# Patient Record
(demographics unavailable — no encounter records)

---

## 2024-11-08 NOTE — HEALTH RISK ASSESSMENT
[Excellent] : ~his/her~  mood as  excellent [Yes] : Yes [Monthly or less (1 pt)] : Monthly or less (1 point) [1 or 2 (0 pts)] : 1 or 2 (0 points) [Never (0 pts)] : Never (0 points) [No] : In the past 12 months have you used drugs other than those required for medical reasons? No [No falls in past year] : Patient reported no falls in the past year [0] : 2) Feeling down, depressed, or hopeless: Not at all (0) [PHQ-2 Negative - No further assessment needed] : PHQ-2 Negative - No further assessment needed [Never] : Never [None] : None [With Family] : lives with family [Feels Safe at Home] : Feels safe at home [Fully functional (bathing, dressing, toileting, transferring, walking, feeding)] : Fully functional (bathing, dressing, toileting, transferring, walking, feeding) [Fully functional (using the telephone, shopping, preparing meals, housekeeping, doing laundry, using] : Fully functional and needs no help or supervision to perform IADLs (using the telephone, shopping, preparing meals, housekeeping, doing laundry, using transportation, managing medications and managing finances) [Reports normal functional visual acuity (ie: able to read med bottle)] : Reports normal functional visual acuity [Smoke Detector] : smoke detector [Seat Belt] :  uses seat belt [Audit-CScore] : 1 [[id number]] : 0 [Change in mental status noted] : No change in mental status noted [Language] : denies difficulty with language [Behavior] : denies difficulty with behavior [Handling Complex Tasks] : denies difficulty handling complex tasks [Reasoning] : denies difficulty with reasoning [Spatial Ability and Orientation] : denies difficulty with spatial ability and orientation [Reports changes in hearing] : Reports no changes in hearing [Reports changes in vision] : Reports no changes in vision [Reports changes in dental health] : Reports no changes in dental health

## 2024-11-08 NOTE — HISTORY OF PRESENT ILLNESS
[FreeTextEntry1] : Ms. SPENCER is here for an annual physical examination and assessment. [de-identified] : She generally feels well with no specific complaints. Her recent health has been good.  Denies headache, dizziness. Denies rash, fatigue, fever, weight loss, anorexia. Denies cough, wheezing. Denies CP, SOB, MENENDEZ, edema, palpitations. Denies abdominal pain, N/V/D/C. Denies BRBPR, melena, dysphagia. Denies dysuria, frequency, hematuria, hesitancy. Denies weakness, numbness, gait instability.

## 2024-11-08 NOTE — HISTORY OF PRESENT ILLNESS
[FreeTextEntry1] : Ms. SPENCER is here for an annual physical examination and assessment. [de-identified] : She generally feels well with no specific complaints. Her recent health has been good.  Denies headache, dizziness. Denies rash, fatigue, fever, weight loss, anorexia. Denies cough, wheezing. Denies CP, SOB, MENENDEZ, edema, palpitations. Denies abdominal pain, N/V/D/C. Denies BRBPR, melena, dysphagia. Denies dysuria, frequency, hematuria, hesitancy. Denies weakness, numbness, gait instability.

## 2024-11-08 NOTE — HEALTH RISK ASSESSMENT
[Excellent] : ~his/her~  mood as  excellent [Yes] : Yes [Monthly or less (1 pt)] : Monthly or less (1 point) [1 or 2 (0 pts)] : 1 or 2 (0 points) [Never (0 pts)] : Never (0 points) [No] : In the past 12 months have you used drugs other than those required for medical reasons? No [No falls in past year] : Patient reported no falls in the past year [0] : 2) Feeling down, depressed, or hopeless: Not at all (0) [PHQ-2 Negative - No further assessment needed] : PHQ-2 Negative - No further assessment needed [Never] : Never [None] : None [With Family] : lives with family [Feels Safe at Home] : Feels safe at home [Fully functional (bathing, dressing, toileting, transferring, walking, feeding)] : Fully functional (bathing, dressing, toileting, transferring, walking, feeding) [Fully functional (using the telephone, shopping, preparing meals, housekeeping, doing laundry, using] : Fully functional and needs no help or supervision to perform IADLs (using the telephone, shopping, preparing meals, housekeeping, doing laundry, using transportation, managing medications and managing finances) [Reports normal functional visual acuity (ie: able to read med bottle)] : Reports normal functional visual acuity [Smoke Detector] : smoke detector [Seat Belt] :  uses seat belt [Audit-CScore] : 1 [[id number]] : 0 [Change in mental status noted] : No change in mental status noted [Language] : denies difficulty with language [Behavior] : denies difficulty with behavior [Handling Complex Tasks] : denies difficulty handling complex tasks [Reasoning] : denies difficulty with reasoning [Spatial Ability and Orientation] : denies difficulty with spatial ability and orientation [Reports changes in hearing] : Reports no changes in hearing [Reports changes in vision] : Reports no changes in vision [Reports changes in dental health] : Reports no changes in dental health

## 2024-12-17 NOTE — HISTORY OF PRESENT ILLNESS
[FreeTextEntry1] : KOFFI [Mammogramdate] : emr [BreastSonogramDate] : emr [PapSmeardate] : emr [BoneDensityDate] : emr [ColonoscopyDate] : emr

## 2024-12-17 NOTE — PHYSICAL EXAM
[Chaperone Present] : A chaperone was present in the examining room during all aspects of the physical examination [Appropriately responsive] : appropriately responsive [Alert] : alert [No Acute Distress] : no acute distress [No Lymphadenopathy] : no lymphadenopathy [Soft] : soft [Non-tender] : non-tender [Non-distended] : non-distended [No HSM] : No HSM [No Lesions] : no lesions [No Mass] : no mass [Oriented x3] : oriented x3 [FreeTextEntry2] : VS see emr [Examination Of The Breasts] : a normal appearance [No Masses] : no breast masses were palpable [Labia Majora] : normal [Labia Minora] : normal [Atrophy] : atrophy [Normal] : normal [Uterine Adnexae] : normal

## 2025-01-17 NOTE — CONSULT LETTER
[Dear  ___] : Dear  [unfilled], [Please see my note below.] : Please see my note below. [Sincerely,] : Sincerely, [FreeTextEntry3] : Najma Dean MD , [facility] at [facility] Division of Rheumatology

## 2025-01-17 NOTE — PHYSICAL EXAM
[General Appearance - In No Acute Distress] : in no acute distress [General Appearance - Alert] : alert [Respiration, Rhythm And Depth] : normal respiratory rhythm and effort [Impaired Insight] : insight and judgment were intact [FreeTextEntry1] : faint telangiectasias face and hands

## 2025-01-17 NOTE — ASSESSMENT
[FreeTextEntry1] : # Raynaud's -longstanding -triggered by cold exposure, resolves with rewarming -no pitting or distal lesions on exam -faint telangiectasias face -JACKSON negative, no other stigmata by history or exam   # Hand, foot pain -appear non inflammatory  -OA changes on exam  # Rash fingers over the summer from pictures appear contact dermatitis  # Osteoporosis receives Prolia with endo   [] obtain SSA/SSB, Scleroderma labs, RF/CCP  [] discussed Raynaud's and treatment at length [] obtain X-rays of hands and feet [] planning to see dermatology per PMD's recommendations  Will call with results

## 2025-01-17 NOTE — HISTORY OF PRESENT ILLNESS
[FreeTextEntry1] :  -1 year and a half ago, slipped and fell on her left hand, outstretched, no fracture but persistent pain has received steroid injection at the wrist -July started noticing a rash over her fingers, swelling of the fingers -history of Raynaud's for many years described as coldness of fingers turn white, resolves after rewarming  -joint reports hand stiffness  no morning stiffness no overt joint swelling    Rheumatologic ROS: - No alopecia - No dry eyes - No dry mouth - No oral ulcers - No photosensitivity - No miscarriages or pregnancy complications - No history of blood clots - No family history of auto-immune disease

## 2025-03-20 NOTE — PHYSICAL EXAM
[Alert] : alert [Well Nourished] : well nourished [No Acute Distress] : no acute distress [Well Developed] : well developed [Normal Sclera/Conjunctiva] : normal sclera/conjunctiva [No Proptosis] : no proptosis [Normal Oropharynx] : the oropharynx was normal [Thyroid Not Enlarged] : the thyroid was not enlarged [No Thyroid Nodules] : no palpable thyroid nodules [Clear to Auscultation] : lungs were clear to auscultation bilaterally [Normal S1, S2] : normal S1 and S2 [Normal Rate] : heart rate was normal [Regular Rhythm] : with a regular rhythm [No Edema] : no peripheral edema [Normal Bowel Sounds] : normal bowel sounds [Not Tender] : non-tender [Not Distended] : not distended [Soft] : abdomen soft [Normal Anterior Cervical Nodes] : no anterior cervical lymphadenopathy [No Spinal Tenderness] : no spinal tenderness [Scoliosis] : scoliosis present [No Stigmata of Cushings Syndrome] : no stigmata of Cushings Syndrome [Normal Gait] : normal gait [Normal Strength/Tone] : muscle strength and tone were normal [No Rash] : no rash [Normal Reflexes] : deep tendon reflexes were 2+ and symmetric [No Tremors] : no tremors [Oriented x3] : oriented to person, place, and time [Normal Rate and Effort] : normal respiratory rate and effort [No Murmurs] : no murmurs [Acanthosis Nigricans] : no acanthosis nigricans [de-identified] : mild scoliosis

## 2025-03-20 NOTE — ASSESSMENT
[Denosumab Therapy] : Risks  and benefits of denosumab therapy were discussed with the patient including eczema, cellulitis, osteonecrosis of the jaw and atypical femur fractures [FreeTextEntry1] : 61 y/o female returns for a follow-up visit for osteoporosis.  Pt states she was told of osteoporosis after outside BMD 06/2021. She has not started any osteoporosis rx. Fh/o in mother and grandmother, no hip fx. No h/o fragility fx.  No unusual risks for osteoporosis. Outside BMD 06/2021 indicates low osteoporosis in spine and hip.  Pt started Fosamax 07/2021. Took correctly, but d/c 12/2021 due to non-specific symptoms.  Pt transitioned to Prolia 01/2022, tolerating well. No thigh pain, no interval fx. Normal Ca. No ONJ.   BMD 03/2025 indicates stable osteoporosis in the spine, improved vs baseline, stable osteoporosis in hips, stable osteopenia in prox. radius. BMD results reviewed w/pt.  Continue Prolia, [facility]  Mild scoliosis clinically stable on physical examination.   Labs: November 2024 Creatinine: 0.66 Calcium: 10.1 Vitamin D: 55.5  F/u in 6 months

## 2025-03-20 NOTE — END OF VISIT
[FreeTextEntry3] :  This note was written by Gabbi Carlin on (March 20, 2025) acting as a medical scribe for Dr. Alicea This note was authored by the medical scribe for me. I have reviewed, edited, and revised the note as needed. I am in agreement with the exam findings, imaging findings, and treatment plan.  Santy Alicea MD

## 2025-03-20 NOTE — HISTORY OF PRESENT ILLNESS
[Alendronate (Fosomax)] : Alendronate [Calcium (dietary)] : dietary Calcium [Vitamin D (oral)] : Vitamin D orally [Regular Dental Follow-Up] : regular dental follow-up [Family History of Osteoporosis] : family history of osteoporosis [Premat. Menopause (natural)] : no history of premature menopause [Premat. Menopause (surgery)] : no history of premature surgical menopause [Taking Steroids] : no history of taking steroids [Hyperparathyroidism] : no history of hyperparathyroidism [Diabetes] : no history of diabetes [Kidney Stones] : no history of kidney stones [Previous Fragility Fracture] : no previous fragility fracture [Family History of Breast Cancer] : no family history of breast cancer [Family History of Hip Fracture] : no family history of hip fracture [History of Radiation Therapy] : no history of radiation therapy [History of Blood Clots] : no history of blood clots [High Fall Risk] : no fall risk [Frequent Falls] : no frequent falls [Uses a Walker/Cane] : no use of a walker/cane [FreeTextEntry1] :  Pt returns for a follow-up visit for osteoporosis. Pt began Prolia 01/2022. No major surgeries, hospitalizations, fractures or changes in medication. Up to date with dentist. No major dental work planned. Added fish oils for dry eyes  Pt states she was told of osteoporosis after outside BMD 06/2021. She has not started any osteoporosis rx. Fh/o in mother and grandmother, no hip fx. No h/o fragility fx.  No unusual risks for osteoporosis. Outside BMD 06/2021 indicates low osteoporosis in spine and hip.  Pt started Fosamax 07/2021. Took correctly, but d/c 12/2021 due to non-specific symptoms. Pt transitioned to Prolia 01/2022, tolerating well. No thigh pain, no interval fx. Normal Ca. No ONJ   BMD 03/2025 indicates stable osteoporosis in the spine, improved vs baseline, stable osteoporosis in hips, stable osteopenia in prox. radius.

## 2025-03-20 NOTE — PROCEDURE
[FreeTextEntry1] : Bone Mineral Density: 03/20/2025 Indication: Comparison to 2023, assess response to medication Spine: L1-3, -2.7, osteoporosis, no significant change, increased vs baseline  Total hip: -2.9, osteoporosis, no significant change Femoral neck: -3.3, osteoporosis, no significant change Proximal radius: -1.2, osteopenia, no significant change  Bone Mineral Density: 02/07/2023 Indication: Comparison to outside study 2021, assess response to medication  Spine -2.8 osteoporosis, prior report -3.4 Total Hip -2.9 osteoporosis, no significant change  Femoral Neck -3.1 osteoporosis, no significant change  Proximal Radius -1.4 osteopenia, no prior   Bone mineral density: 6/2021 Spine: -3.4 osteoporosis Total hip: -3.0 osteoporosis Femoral neck: -3.1 osteoporosis

## 2025-03-24 NOTE — HISTORY OF PRESENT ILLNESS
[FreeTextEntry1] : This 61 yo presents to discuss a resolved issue after taking Doxycycline for a dermatologic eye issue over one month ago; about day 8 of taking the medication, she noted swelling and itching to the vulvar area that resolved- she was going on vacation the day she stopped taking the medication, no sexual activity the week following the swelling, but it resolved without any Benadryl.  Presently denies any vaginal discharge, possible slight vague itch to the vulva; no other issue voiced.

## 2025-03-24 NOTE — PLAN
[FreeTextEntry1] : Advised to consider seeking care with an Allergist to confirm whether she had an allergic reaction Advised use of Benadryl in the future for any possible allergic reaction Vaseline prn RTO prn

## 2025-03-24 NOTE — PHYSICAL EXAM
[Chaperone Present] : A chaperone was present in the examining room during all aspects of the physical examination [Labia Majora] : normal [Labia Minora] : normal [Atrophy] : atrophy [No Bleeding] : There was no active vaginal bleeding [Normal] : normal [FreeTextEntry2] : Rocio